# Patient Record
Sex: MALE | Race: OTHER
[De-identification: names, ages, dates, MRNs, and addresses within clinical notes are randomized per-mention and may not be internally consistent; named-entity substitution may affect disease eponyms.]

---

## 2018-09-14 ENCOUNTER — HOSPITAL ENCOUNTER (EMERGENCY)
Dept: HOSPITAL 72 - EMR | Age: 33
LOS: 1 days | Discharge: HOME | End: 2018-09-15
Payer: COMMERCIAL

## 2018-09-14 VITALS — SYSTOLIC BLOOD PRESSURE: 147 MMHG | DIASTOLIC BLOOD PRESSURE: 83 MMHG

## 2018-09-14 VITALS — WEIGHT: 190 LBS | HEIGHT: 69 IN | BODY MASS INDEX: 28.14 KG/M2

## 2018-09-14 DIAGNOSIS — K40.90: Primary | ICD-10-CM

## 2018-09-14 LAB
APPEARANCE UR: CLEAR
APTT PPP: (no result) S
GLUCOSE UR STRIP-MCNC: NEGATIVE MG/DL
KETONES UR QL STRIP: NEGATIVE
LEUKOCYTE ESTERASE UR QL STRIP: NEGATIVE
NITRITE UR QL STRIP: NEGATIVE
PH UR STRIP: 6.5 [PH] (ref 4.5–8)
PROT UR QL STRIP: NEGATIVE
SP GR UR STRIP: 1.01 (ref 1–1.03)
UROBILINOGEN UR-MCNC: NORMAL MG/DL (ref 0–1)

## 2018-09-14 PROCEDURE — 81003 URINALYSIS AUTO W/O SCOPE: CPT

## 2018-09-14 PROCEDURE — 74176 CT ABD & PELVIS W/O CONTRAST: CPT

## 2018-09-14 PROCEDURE — 99284 EMERGENCY DEPT VISIT MOD MDM: CPT

## 2018-09-14 NOTE — DIAGNOSTIC IMAGING REPORT
EXAM:

  CT Abdomen and Pelvis Without Intravenous Contrast

 

CLINICAL HISTORY:

  ABD PAIN

 

TECHNIQUE:

  Axial computed tomography images of the abdomen and pelvis without 

intravenous contrast.  CTDI is 16.38 mGy and DLP is 1019 mGy-cm  One or 

more of the following dose reduction techniques were used: automated 

exposure control, adjustment of the mA and/or kV according to patient 

size, use of iterative reconstruction technique.

 

COMPARISON:

  No relevant prior studies available.

 

FINDINGS:

  Lung bases:  Unremarkable.  No mass.  No consolidation.

 

 ABDOMEN:

  Liver:  Unremarkable.

  Gallbladder and bile ducts:  Unremarkable.  No calcified stones.  No 

ductal dilation.

  Pancreas:  Unremarkable.  No ductal dilation.

  Spleen:  Unremarkable.  No splenomegaly.

  Adrenals:  Unremarkable.  No mass.

  Kidneys and ureters:  Unremarkable.  No obstructing stones.  No 

hydronephrosis.

  Stomach and bowel: No bowel obstruction or perforation

 

 PELVIS:

  Appendix: Unremarkable appendix.  

Bladder:  Unremarkable.  No stones.

  Reproductive:  Unremarkable as visualized.

 

 ABDOMEN and PELVIS:

  Intraperitoneal space:  Unremarkable.  No free air.  No significant 

fluid collection.

  Bones/joints:  No acute fracture.  No dislocation.

  Soft tissues: Small fatty left inguinal hernia..

  Vasculature:  Unremarkable.  No abdominal aortic aneurysm.

  Lymph nodes:  Unremarkable.  No enlarged lymph nodes.

 

IMPRESSION:     

Small fatty left inguinal hernia.  Otherwise unremarkable exam.

## 2018-09-15 VITALS — SYSTOLIC BLOOD PRESSURE: 147 MMHG | DIASTOLIC BLOOD PRESSURE: 83 MMHG

## 2018-09-15 NOTE — EMERGENCY ROOM REPORT
History of Present Illness


General


Chief Complaint:  Abdominal Pain


Source:  Patient





Present Illness


[facility]


This is a 32-year-old male who works at [employer].  He presents with chief 

complaint of suprapubic pain.  Onset was acute after moving boxes.  He has a 

history hernia before.  Pain is sharp in nature.  He was sent home.  No fever 

chills but no nausea no vomiting.  No mass.  No dysuria.  Pain is 8 out of 10.


Allergies:  


Coded Allergies:  


     No Known Allergies (Unverified , 9/14/18)





Patient History


Past Medical History:  none, see triage record, old chart reviewed


Past Surgical History:  none


Pertinent Family History:  none


Social History:  Denies: smoking


Immunizations:  other


Reviewed Nursing Documentation:  PMH: Agreed; PSxH: Agreed





Nursing Documentation-PMH


Past Medical History:  No Stated History





Review of Systems


Eye:  Denies: eye pain, blurred vision


ENT:  Denies: ear pain, nose congestion, throat swelling


Respiratory:  Denies: cough, shortness of breath


Cardiovascular:  Denies: chest pain, palpitations


Gastrointestinal:  Reports: abdominal pain; Denies: diarrhea, nausea, vomiting


Musculoskeletal:  Denies: back pain, joint pain


Skin:  Denies: rash


Neurological:  Denies: headache, numbness


Endocrine:  Denies: increased thirst, increased urine


Hematologic/Lymphatic:  Denies: easy bruising


All Other Systems:  negative except mentioned in HPI





Physical Exam





Vital Signs








  Date Time  Temp Pulse Resp B/P (MAP) Pulse Ox O2 Delivery O2 Flow Rate FiO2


 


9/14/18 22:15 98.2 94 16 147/83 97 Room Air  





 98.2       





vitals normal


Sp02 EP Interpretation:  reviewed, normal


General Appearance:  well appearing, no apparent distress, alert


Head:  normocephalic, atraumatic


Eyes:  bilateral eye PERRL, bilateral eye EOMI


ENT:  hearing grossly normal, normal pharynx


Neck:  full range of motion, supple, no meningismus


Respiratory:  chest non-tender, lungs clear, normal breath sounds


Cardiovascular #1:  regular rate, rhythm, no murmur


Gastrointestinal:  normal bowel sounds, non tender, no mass, no organomegaly, 

no bruit, non-distended


Genitourinary:  normal inspection, no CVA tenderness, penis normal, other - 

Tenderness over the suprapubic area. no palpable mass


Musculoskeletal:  back normal, gait/station normal, normal range of motion


Neurologic:  alert, oriented x3


Psychiatric:  mood/affect normal


Skin:  warm/dry





Medical Decision Making


Diagnostic Impression:  


 Primary Impression:  


 Inguinal hernia, left


ER Course


Patient presents with suprapubic pain and had a small inner hernia.  No 

evidence of any obstruction or incarceration.  We'll discharge home.


CT/MRI/US Diagnostic Results


CT/MRI/US Diagnostic Results :  


   Imaging Test Ordered:  CT abdomen and pelvis


   Impression


Read by radiologist.  Small fatty left inguinal hernia.





Last Vital Signs








  Date Time  Temp Pulse Resp B/P (MAP) Pulse Ox O2 Delivery O2 Flow Rate FiO2


 


9/14/18 23:38 98.2       


 


9/14/18 22:30  74 16 147/83 97 Room Air  








Status:  improved


Disposition:  HOME, SELF-CARE


Condition:  Stable


Scripts


Ibuprofen* (MOTRIN*) 600 Mg Tablet


600 MG ORAL THREE TIMES A DAY, #30 TAB 0 Refills


   Prov: TRENT MERCHANT M.D.         9/15/18





Additional Instructions:  


Follow-up with your doctor in 7 days.  No heavy lifting.  Return if worse.











TRENT MERCHANT M.D. Sep 15, 2018 00:08